# Patient Record
Sex: FEMALE | Race: BLACK OR AFRICAN AMERICAN | Employment: FULL TIME | URBAN - METROPOLITAN AREA
[De-identification: names, ages, dates, MRNs, and addresses within clinical notes are randomized per-mention and may not be internally consistent; named-entity substitution may affect disease eponyms.]

---

## 2018-02-08 PROBLEM — E66.01 OBESITY, MORBID (HCC): Status: ACTIVE | Noted: 2018-02-08

## 2020-03-17 ENCOUNTER — HOSPITAL ENCOUNTER (EMERGENCY)
Age: 30
Discharge: HOME | End: 2020-03-17
Attending: EMERGENCY MEDICINE
Payer: COMMERCIAL

## 2020-03-17 VITALS
WEIGHT: 293 LBS | SYSTOLIC BLOOD PRESSURE: 157 MMHG | HEIGHT: 63 IN | OXYGEN SATURATION: 96 % | DIASTOLIC BLOOD PRESSURE: 88 MMHG | TEMPERATURE: 98.6 F | HEART RATE: 97 BPM | BODY MASS INDEX: 51.91 KG/M2 | RESPIRATION RATE: 18 BRPM

## 2020-03-17 DIAGNOSIS — L02.91 ABSCESS: Primary | ICD-10-CM

## 2020-03-17 PROCEDURE — 99283 EMERGENCY DEPT VISIT LOW MDM: CPT

## 2020-03-17 PROCEDURE — 75810000289 HC I&D ABSCESS SIMP/COMP/MULT

## 2020-03-17 RX ORDER — SULFAMETHOXAZOLE AND TRIMETHOPRIM 800; 160 MG/1; MG/1
1 TABLET ORAL 2 TIMES DAILY
Qty: 14 TAB | Refills: 0 | Status: SHIPPED | OUTPATIENT
Start: 2020-03-17

## 2020-03-17 NOTE — DISCHARGE INSTRUCTIONS
Keep clean warm compresses twice a day, use antibiotics as directed along with 800 mg of ibuprofen 3 times a day.   Return to ER symptoms worsen or see your primary care physician for routine recheck

## 2020-03-17 NOTE — ED PROVIDER NOTES
Patient states over the past 2 weeks she has had small abscesses formed to the right breast area 2 days ago she had lesion occur that is just been getting bigger and more tender, no history of the same, patient is obese she is not diabetic    The history is provided by the patient. Skin Problem    This is a new problem. The current episode started 2 days ago. The problem has been gradually worsening. The problem is associated with an unknown factor. There has been no fever. Affected Location: Right breast. The pain is at a severity of 7/10. The pain is mild. The pain has been constant since onset. Associated symptoms include pain. She has tried nothing for the symptoms. The treatment provided no relief. History reviewed. No pertinent past medical history. Past Surgical History:   Procedure Laterality Date    HX GYN               History reviewed. No pertinent family history.     Social History     Socioeconomic History    Marital status: SINGLE     Spouse name: Not on file    Number of children: Not on file    Years of education: Not on file    Highest education level: Not on file   Occupational History    Not on file   Social Needs    Financial resource strain: Not on file    Food insecurity     Worry: Not on file     Inability: Not on file    Transportation needs     Medical: Not on file     Non-medical: Not on file   Tobacco Use    Smoking status: Never Smoker    Smokeless tobacco: Never Used   Substance and Sexual Activity    Alcohol use: No    Drug use: No    Sexual activity: Never   Lifestyle    Physical activity     Days per week: Not on file     Minutes per session: Not on file    Stress: Not on file   Relationships    Social connections     Talks on phone: Not on file     Gets together: Not on file     Attends Gnosticism service: Not on file     Active member of club or organization: Not on file     Attends meetings of clubs or organizations: Not on file     Relationship status: Not on file    Intimate partner violence     Fear of current or ex partner: Not on file     Emotionally abused: Not on file     Physically abused: Not on file     Forced sexual activity: Not on file   Other Topics Concern    Not on file   Social History Narrative    Not on file         ALLERGIES: Tramadol    Review of Systems   All other systems reviewed and are negative. Vitals:    03/17/20 1734   BP: 141/83   Pulse: 97   Resp: 18   Temp: 98.6 °F (37 °C)   SpO2: 96%   Weight: 149.8 kg (330 lb 3 oz)   Height: 5' 3\" (1.6 m)            Physical Exam  Vitals signs and nursing note reviewed. Constitutional:       General: She is not in acute distress. Appearance: Normal appearance. She is well-developed. She is obese. She is not diaphoretic. HENT:      Head: Normocephalic and atraumatic. Right Ear: External ear normal.      Left Ear: External ear normal.      Nose: Nose normal.      Mouth/Throat:      Mouth: Mucous membranes are moist.   Eyes:      Pupils: Pupils are equal, round, and reactive to light. Neck:      Musculoskeletal: Normal range of motion and neck supple. Cardiovascular:      Rate and Rhythm: Normal rate and regular rhythm. Pulmonary:      Effort: Pulmonary effort is normal.      Breath sounds: Normal breath sounds. Abdominal:      General: Bowel sounds are normal.      Palpations: Abdomen is soft. Musculoskeletal: Normal range of motion. General: Tenderness present. Comments: Right very lateral almost to the axillary area of the breast with red firm tender area about 2 cm, scars from previous abscesses noted. No axillary nodes, no problems with the nipple area   Skin:     General: Skin is warm. Neurological:      Mental Status: She is alert and oriented to person, place, and time. MDM  Number of Diagnoses or Management Options  Diagnosis management comments: Early abscess to right breast with I&D, will place on Septra and warm compresses. See primary care doctor for routine recheck. Return to ER symptoms worsen       Amount and/or Complexity of Data Reviewed  Review and summarize past medical records: yes    Risk of Complications, Morbidity, and/or Mortality  Presenting problems: low  Diagnostic procedures: low  Management options: low    Patient Progress  Patient progress: improved         I&D Abcess Simple  Date/Time: 3/17/2020 5:59 PM  Performed by: PRATEEK Pinon  Authorized by: PRATEEK Pinon     Consent:     Consent obtained:  Verbal    Consent given by:  Patient    Risks discussed:  Incomplete drainage    Alternatives discussed:  Alternative treatment  Location:     Type:  Abscess    Size:  2.0 cm     Location:  Trunk    Trunk location:  R breast  Pre-procedure details:     Skin preparation:  Betadine  Anesthesia (see MAR for exact dosages): Anesthesia method:  Local infiltration    Local anesthetic:  Lidocaine 1% w/o epi  Procedure type:     Complexity:  Simple  Procedure details:     Needle aspiration: no      Incision types:  Single straight    Incision depth:  Dermal    Scalpel blade:  11    Drainage:  Purulent    Drainage amount:  Scant    Wound treatment:  Wound left open    Packing materials:  None  Post-procedure details:     Patient tolerance of procedure:   Tolerated well, no immediate complications

## 2020-03-17 NOTE — ED NOTES
I have reviewed discharge instructions with the patient. The patient verbalized understanding. Patient left ED via Discharge Method: ambulatory to Home with self. Opportunity for questions and clarification provided. Patient given 1 scripts. To continue your aftercare when you leave the hospital, you may receive an automated call from our care team to check in on how you are doing. This is a free service and part of our promise to provide the best care and service to meet your aftercare needs.  If you have questions, or wish to unsubscribe from this service please call [phone]. Thank you for Choosing our [facility] Emergency Department.